# Patient Record
Sex: FEMALE
[De-identification: names, ages, dates, MRNs, and addresses within clinical notes are randomized per-mention and may not be internally consistent; named-entity substitution may affect disease eponyms.]

---

## 2017-07-12 ENCOUNTER — HOSPITAL ENCOUNTER (OUTPATIENT)
Dept: HOSPITAL 42 - ENDO | Age: 62
Discharge: HOME | End: 2017-07-12
Attending: SPECIALIST
Payer: COMMERCIAL

## 2017-07-12 VITALS
SYSTOLIC BLOOD PRESSURE: 117 MMHG | DIASTOLIC BLOOD PRESSURE: 67 MMHG | TEMPERATURE: 98.1 F | RESPIRATION RATE: 18 BRPM | OXYGEN SATURATION: 98 % | HEART RATE: 59 BPM

## 2017-07-12 VITALS — BODY MASS INDEX: 26.3 KG/M2

## 2017-07-12 DIAGNOSIS — I10: ICD-10-CM

## 2017-07-12 DIAGNOSIS — K21.9: ICD-10-CM

## 2017-07-12 DIAGNOSIS — K29.70: ICD-10-CM

## 2017-07-12 DIAGNOSIS — Z86.73: ICD-10-CM

## 2017-07-12 DIAGNOSIS — K31.7: Primary | ICD-10-CM

## 2017-07-12 DIAGNOSIS — M48.02: ICD-10-CM

## 2017-07-12 PROCEDURE — 43239 EGD BIOPSY SINGLE/MULTIPLE: CPT

## 2017-07-12 PROCEDURE — 88305 TISSUE EXAM BY PATHOLOGIST: CPT

## 2017-07-12 PROCEDURE — 88342 IMHCHEM/IMCYTCHM 1ST ANTB: CPT

## 2018-04-18 ENCOUNTER — HOSPITAL ENCOUNTER (EMERGENCY)
Dept: HOSPITAL 42 - ED | Age: 63
Discharge: HOME | End: 2018-04-18
Payer: COMMERCIAL

## 2018-04-18 VITALS — BODY MASS INDEX: 26.3 KG/M2

## 2018-04-18 VITALS — TEMPERATURE: 97.9 F | DIASTOLIC BLOOD PRESSURE: 76 MMHG | HEART RATE: 72 BPM | SYSTOLIC BLOOD PRESSURE: 115 MMHG

## 2018-04-18 VITALS — OXYGEN SATURATION: 99 % | RESPIRATION RATE: 16 BRPM

## 2018-04-18 DIAGNOSIS — M25.511: Primary | ICD-10-CM

## 2018-04-18 PROCEDURE — 99283 EMERGENCY DEPT VISIT LOW MDM: CPT

## 2018-04-18 PROCEDURE — 96372 THER/PROPH/DIAG INJ SC/IM: CPT

## 2018-04-18 PROCEDURE — 73030 X-RAY EXAM OF SHOULDER: CPT

## 2018-04-18 NOTE — ED PDOC
Arrival/HPI





- General


Chief Complaint: Upper Extremity Problem/Injury


Time Seen by Provider: 04/18/18 15:35


Historian: Patient





- History of Present Illness


Narrative History of Present Illness (Text): 





04/18/18 18:06


62yr old female with right sided shoulder pain s/p injury 2 days ago. pt states 

that 2 days ago she was lifting a heavy object and developed pain in the 

anterior aspect of the right shoulder. pt states she has pain with range of 

motion of the shoulder. pt states pain radiates into the upper arm. pt denies 

numbness weakness or tingling in the extremities. Patient denies fevers or 

chills. Patient denies neck or back pain. Patient states she has been taking 

tramadol and muscle relaxer without improvement. No other complaints





Past Medical History





- Provider Review


Nursing Documentation Reviewed: Yes





- Travel History


Have you recently traveled outside [place] w/in the past 3 mons?: No





- Infectious Disease


Hx of Infectious Diseases: None





- Tetanus Immunization


Tetanus Immunization: Unknown





- Reproductive


Menopause: Yes





- Cardiac


Hx Pacemaker: No





- Pulmonary


Hx Respiratory Disorders: No





- Neurological


Hx Paralysis: No





- HEENT


Hx HEENT Disorder: Yes (glasses/ Hearing loss bilateral)


Hx Macular Degeneration: Yes





- Renal


Hx Renal Disorder: No





- Endocrine/Metabolic


Hx Endocrine Disorders: No





- Hematological/Oncological


Hx Blood Transfusions: No





- Integumentary


Hx Dermatological Disorder: No





- Musculoskeletal/Rheumatological


Hx Musculoskeletal Disorders: Yes





- Gastrointestinal


Hx Gastroesophageal Reflux: Yes


Other/Comment: irital bowel syndrome





- Genitourinary/Gynecological


Hx Genitourinary Disorders: No





- Psychiatric


Hx Emotional Abuse: No


Hx Physical Abuse: No


Hx Substance Use: No





- Surgical History


Hx Appendectomy: Yes





- Anesthesia


Hx Anesthesia Reactions: No


Hx Malignant Hyperthermia: No





- Suicidal Assessment


Feels Threatened In Home Enviroment: No





Family/Social History





- Physician Review


Nursing Documentation Reviewed: Yes


Family/Social History: Unknown Family HX


Smoking Status: Never Smoked


Hx Alcohol Use: Yes (OCCASIONAL RED WINE)


Hx Substance Use: No


Hx Substance Use Treatment: No





Allergies/Home Meds


Allergies/Adverse Reactions: 


Allergies





No Known Allergies Allergy (Verified 02/22/17 01:49)


 








Home Medications: 


 Home Meds











 Medication  Instructions  Recorded  Confirmed


 


Flaxseed Oil 2 tab PO DAILY 12/17/13 07/12/17


 


Topiramate [Topamax] 25 mg PO DAILY 11/07/16 07/12/17


 


Aspirin [Ecotrin] 81 mg PO DAILY 11/16/16 07/12/17


 


Calcium/Vitamin D [Oyster Shell 1 tab PO BID 11/16/16 07/12/17





Calcium/Vitamin D 500 mg-200 IU]   


 


Clopidogrel [Plavix] 75 mg PO DAILY 11/16/16 07/12/17


 


Multivitamin [One Daily] 1 tab PO DAILY 11/16/16 07/12/17


 


Pravastatin Sodium [Pravachol] 40 mg PO DAILY 11/16/16 02/22/17


 


Valsartan/Hydrochlorothiazide 1 tab PO DAILY 11/16/16 07/12/17





[Valsartan and Hydrochlorothiazide   





12.5 mg-320]   


 


Vit C/E/Zn/Coppr/Lutein/Zeaxan 2 cap PO DAILY 02/22/17 07/12/17





[Preservision Areds 2 Softgel]   


 


Chlorzoxazone [Lorzone] 500 mg PO TID PRN 07/06/17 07/12/17


 


Cholecalciferol (Vitamin D3) 2,000 unit PO DAILY 07/06/17 07/12/17





[Vitamin D3]   


 


traMADol [Ultram] 50 mg PO DAILY PRN 07/06/17 07/12/17


 


Hydrocortisone 2.5% (Rectal) 1 appl RC BID 07/12/17 07/12/17





[Anusol-HC]   














Review of Systems





- Review of Systems


Constitutional: absent: Fatigue, Fevers


Respiratory: absent: SOB, Cough


Cardiovascular: absent: Chest Pain, Palpitations


Gastrointestinal: absent: Abdominal Pain, Nausea, Vomiting


Musculoskeletal: Arthralgias.  absent: Back Pain, Neck Pain


Skin: absent: Rash, Pruritis


Neurological: absent: Headache, Dizziness


Psychiatric: absent: Anxiety, Depression





Physical Exam


Vital Signs Reviewed: Yes


Vital Signs











  Temp Pulse Resp BP Pulse Ox


 


 04/18/18 15:39  98.5 F  76  18  119/74  96











Temperature: Afebrile


Blood Pressure: Normal


Pulse: Regular


Respiratory Rate: Normal


Appearance: Positive for: Well-Appearing, Non-Toxic, Comfortable


Pain Distress: None


Mental Status: Positive for: Alert and Oriented X 3





- Systems Exam


Head: Present: Atraumatic


Neck: Present: Normal Range of Motion.  No: MIDLINE TENDERNESS, Paraspinal 

Tenderness


Respiratory/Chest: Present: Clear to Auscultation, Good Air Exchange.  No: 

Respiratory Distress, Accessory Muscle Use


Cardiovascular: Present: Regular Rate and Rhythm, Normal S1, S2.  No: Murmurs


Back: Present: Normal Inspection


Upper Extremity: Present: Normal Inspection, NORMAL PULSES, Tenderness (right 

shoulder; + ttp over anterior aspect of shoulder; limited abduction with pain; 

sensation and distal pulses intact. cap refill <2. no erythema, no edema, no 

ecchymosis; full rom of hand,wrist, elbow. ), Neurovascularly Intact, Capillary 

Refill < 2s.  No: Normal ROM, Swelling, Erythema, Deformity


Neurological: Present: GCS=15, Speech Normal


Skin: Present: Warm, Dry, Normal Color.  No: Rashes


Psychiatric: Present: Alert, Oriented x 3





Medical Decision Making


ED Course and Treatment: 





04/18/18 18:09


Patient nontoxic well-appearing in no distress with stable vital signs





X-rays of the right shoulder; FINDINGS:





BONES:


There is no acute displaced fracture or bone destruction.  Bone alignment is 

normal.  There is diffuse bone demineralization.





JOINTS:


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 

glenohumeral joints.





SOFT TISSUES:


Normal.





OTHER FINDINGS:


None.





IMPRESSION:


No acute fracture or dislocation.








toradol im 


valium PO 








Patient reassessment: Patient feeling better after medications. Vital signs are 

stable.








I discussed all results with patient advised to followup with the orthopedist 

for the next 2 days. Return if symptoms worsen persist or new symptoms develop


i advised the patient that although the xrays show no fracture; there is still 

a possibility for ligamentous or tendon injury the patient must see the 

orthopedist for further evaluation.








Patient verbalizes understanding of discharge instructions and need for 

immediate followup.





all aspects of this case were discussed the attending of record. 





Impression: shoulder pain


tylenol every 4 hours as needed for pain


valium; 1 tablet every 8 hours as needed for muscle spasms; may cause drowsiness


Rest, ice, compression, elevation


Followup with the orthopedist within the next 2 days 


Followup with primary care physician within the next 2 days


Return if any other concerning symptoms develop











- RAD Interpretation


Radiology Orders: 








04/18/18 17:07


SHOULDER RIGHT [RAD] Stat 














- Medication Orders


Current Medication Orders: 











Discontinued Medications





Diazepam (Valium)  2 mg PO ONCE ONE


   PRN Reason: Protocol


   Stop: 04/18/18 16:22


   Last Admin: 04/18/18 16:54  Dose: 2 mg





Ketorolac Tromethamine (Toradol)  60 mg IM STAT STA


   Stop: 04/18/18 16:22


   Last Admin: 04/18/18 16:55  Dose: 60 mg





MAR Pain Assessment


 Document     04/18/18 16:55  MS  (Rec: 04/18/18 16:56  MS  [id number])


     Pain Reassessment


      Is this a pain reassessment?               No


     Sleep


      Is patient sleeping during reassessment?   No


     Presence of Pain


      Presence of Pain                           Yes


     Pain Scale Used


      Pain Scale Used                            Numeric


     Location


      Left, Right or Bilateral                   Right


      Pain Location Body Site                    Shoulder


     Description


      Description                                Intermittent


      Intensity of Pain at present               7


      Pain Behavior                              Grasping Site


IM Administration Charges


 Document     04/18/18 16:55  MS  (Rec: 04/18/18 16:56  MS  [id number])


     Injection Site


      MAR Injection Site                         Right Deltoid


     Charges for Administration


      # of IM Administrations                    1














Disposition/Present on Arrival





- Present on Arrival


Any Indicators Present on Arrival: No


History of DVT/PE: No


History of Uncontrolled Diabetes: No


Urinary Catheter: No


History of Decub. Ulcer: No


History Surgical Site Infection Following: None





- Disposition


Have Diagnosis and Disposition been Completed?: Yes


Diagnosis: 


 Shoulder pain





Disposition: HOME/ ROUTINE


Disposition Time: 19:24


Patient Plan: Discharge


Condition: GOOD


Discharge Instructions (ExitCare):  Shoulder Pain (DC)


Additional Instructions: 


tylenol every 4 hours as needed for pain


valium; 1 tablet every 8 hours as needed for muscle spasms; may cause drowsiness


Rest, ice, compression, elevation


Followup with the orthopedist within the next 2 days 


Followup with primary care physician within the next 2 days


Return if any other concerning symptoms develop


Prescriptions: 


diaZEpam [Valium] 2 mg PO Q8H PRN #6 tab


 PRN Reason: muscle spasms


Referrals: 


Jamison Portillo MD [Primary Care Provider] - Follow up with primary


Angel Braga DO [Staff Provider] - Follow up with primary


Forms:  [employer] (English), WORK NOTE

## 2018-04-18 NOTE — RAD
PROCEDURE:  Radiographs of the Right Shoulder



HISTORY:

right shoulder pain



COMPARISON:

No prior.



FINDINGS:



BONES:

There is no acute displaced fracture or bone destruction.  Bone 

alignment is normal.  There is diffuse bone demineralization.



JOINTS:

There is mild degenerative osteoarthrosis in the acromioclavicular 

and glenohumeral joints.



SOFT TISSUES:

Normal.



OTHER FINDINGS:

None.



IMPRESSION:

No acute fracture or dislocation.

## 2018-12-05 ENCOUNTER — HOSPITAL ENCOUNTER (OUTPATIENT)
Dept: HOSPITAL 42 - ENDO | Age: 63
Discharge: HOME | End: 2018-12-05
Attending: SPECIALIST
Payer: COMMERCIAL

## 2018-12-05 VITALS
TEMPERATURE: 97.8 F | DIASTOLIC BLOOD PRESSURE: 65 MMHG | RESPIRATION RATE: 16 BRPM | HEART RATE: 70 BPM | SYSTOLIC BLOOD PRESSURE: 105 MMHG

## 2018-12-05 VITALS — BODY MASS INDEX: 26.3 KG/M2

## 2018-12-05 VITALS — OXYGEN SATURATION: 98 %

## 2018-12-05 DIAGNOSIS — K64.8: ICD-10-CM

## 2018-12-05 DIAGNOSIS — I10: ICD-10-CM

## 2018-12-05 DIAGNOSIS — Z12.11: Primary | ICD-10-CM

## 2018-12-05 DIAGNOSIS — K62.1: ICD-10-CM

## 2018-12-05 DIAGNOSIS — Z86.010: ICD-10-CM

## 2018-12-05 PROCEDURE — 88305 TISSUE EXAM BY PATHOLOGIST: CPT

## 2018-12-05 PROCEDURE — 45380 COLONOSCOPY AND BIOPSY: CPT
